# Patient Record
Sex: FEMALE | Race: WHITE
[De-identification: names, ages, dates, MRNs, and addresses within clinical notes are randomized per-mention and may not be internally consistent; named-entity substitution may affect disease eponyms.]

---

## 2018-07-17 ENCOUNTER — HOSPITAL ENCOUNTER (OUTPATIENT)
Dept: HOSPITAL 74 - JASU-ENDO | Age: 73
Discharge: HOME | End: 2018-07-17
Attending: INTERNAL MEDICINE
Payer: COMMERCIAL

## 2018-07-17 VITALS — DIASTOLIC BLOOD PRESSURE: 64 MMHG | SYSTOLIC BLOOD PRESSURE: 119 MMHG

## 2018-07-17 VITALS — HEART RATE: 63 BPM

## 2018-07-17 VITALS — BODY MASS INDEX: 25.7 KG/M2

## 2018-07-17 VITALS — TEMPERATURE: 98 F

## 2018-07-17 DIAGNOSIS — K64.8: ICD-10-CM

## 2018-07-17 DIAGNOSIS — K57.30: ICD-10-CM

## 2018-07-17 DIAGNOSIS — Z86.010: ICD-10-CM

## 2018-07-17 DIAGNOSIS — K63.5: ICD-10-CM

## 2018-07-17 DIAGNOSIS — Z51.11: Primary | ICD-10-CM

## 2018-07-17 PROCEDURE — 0DBG8ZX EXCISION OF LEFT LARGE INTESTINE, VIA NATURAL OR ARTIFICIAL OPENING ENDOSCOPIC, DIAGNOSTIC: ICD-10-PCS | Performed by: INTERNAL MEDICINE

## 2018-07-17 PROCEDURE — 0DBH8ZX EXCISION OF CECUM, VIA NATURAL OR ARTIFICIAL OPENING ENDOSCOPIC, DIAGNOSTIC: ICD-10-PCS | Performed by: INTERNAL MEDICINE

## 2018-07-24 ENCOUNTER — HOSPITAL ENCOUNTER (OUTPATIENT)
Dept: HOSPITAL 74 - JASU-ENDO | Age: 73
Discharge: HOME | End: 2018-07-24
Attending: INTERNAL MEDICINE
Payer: COMMERCIAL

## 2018-07-24 VITALS — HEART RATE: 66 BPM | DIASTOLIC BLOOD PRESSURE: 73 MMHG | SYSTOLIC BLOOD PRESSURE: 128 MMHG

## 2018-07-24 VITALS — TEMPERATURE: 97.9 F

## 2018-07-24 VITALS — BODY MASS INDEX: 26.5 KG/M2

## 2018-07-24 DIAGNOSIS — D21.4: ICD-10-CM

## 2018-07-24 DIAGNOSIS — K44.9: ICD-10-CM

## 2018-07-24 DIAGNOSIS — K29.50: Primary | ICD-10-CM

## 2018-07-24 PROCEDURE — 0DB68ZX EXCISION OF STOMACH, VIA NATURAL OR ARTIFICIAL OPENING ENDOSCOPIC, DIAGNOSTIC: ICD-10-PCS | Performed by: INTERNAL MEDICINE

## 2018-07-24 NOTE — PATH
Surgical Pathology Report



Patient Name:  ALEJANDRO NEWELL

Accession #:  [id number]

[facility]. Rec. #:  [id number]                                                        

   /Age/Gender:  1945 (Age: 73) / F

Account:  [id number]                                                          

             Location: [facility]-ENDOSCOPY

Taken:  2018

Received:  2018

Reported:  2018

Physicians:  Luciano Hernandez D.O.

  



Specimen(s) Received

A: CECAL POLYP 

B: DESCENDING COLON POLYP 





Clinical History

History of colon polyp

Postoperative diagnosis: Diverticulosis, polyp







Final Diagnosis

A. CECUM, POLYP, POLYPECTOMY:

POLYPOID COLONIC MUCOSA WITH MARKED CAUTERY ARTIFACT.



B. DESCENDING COLON, POLYP, POLYPECTOMY:

COLONIC MUCOSA WITH SMALL LYMPHOID AGGREGATE AND SUPERFICIAL HYPERPLASTIC

FEATURES.







***Electronically Signed***

Briseida Lazo M.D.





Gross Description

A.  Received in formalin, labeled "cecal polyp" are 2 tan, irregular portions of

soft tissue measuring 0.1 and 0.3 cm. in greatest dimension. The specimens are

submitted in toto in one cassette.



B.  Received in formalin, labeled "descending colon polyp" is a tan, irregular

portion of soft tissue measuring 0.4 cm. in greatest dimension. The specimen is

submitted in toto in one cassette.

## 2018-07-26 NOTE — PATH
Surgical Pathology Report



Patient Name:  ALEJANDRO NEWELL

Accession #:  [id number]

[facility]. Rec. #:  [id number]                                                        

   /Age/Gender:  1945 (Age: 73) / F

Account:  [id number]                                                          

             Location: [facility]-ENDOSCOPY

Taken:  2018

Received:  2018

Reported:  2018

Physicians:  Lucinao Hernandez D.O.

  



Specimen(s) Received

A: BX ANTRUM NODULE 

B: BX SUBMUCOSAL CARDIA NODULE 





Clinical History

Unspecified chronic gastritis without bleeding

Postoperative diagnosis: Gastritis, submucosal nodule







Final Diagnosis

A. STOMACH, ANTRUM, NODULE, BIOPSY:

POLYPOID GASTRIC ANTRAL MUCOSA WITH MILD CHRONIC GASTRITIS AND FOCAL INTESTINAL

METAPLASIA. IMMUNOHISTOCHEMICAL STAIN FOR H. PYLORI IS NEGATIVE.



B. STOMACH, CARDIA, NODULE, BIOPSY:

LEIOMYOMA.

GASTRIC CARDIAC TYPE MUCOSA WITH MILD CHRONIC GASTRITIS. IMMUNOHISTOCHEMICAL

STAIN FOR H. PYLORI IS NEGATIVE.



Comment: Part B., A separate fragment of nodular spindle proliferation without

significant cytologic atypia is present. Immunohistochemical stains performed at

[place], [place] ([id number]) and interpreted at [facility] show this lesion is positive for SMA and desmin, while

negative for S100, , and DOG-1, consistent with a leiomyoma.





***Electronically Signed***

Briseida Lazo M.D.





Gross Description

A.  Received in formalin, labeled "biopsy antrum nodule" are 3 tan, irregular

portions of soft tissue ranging from 0.2-0.5 cm. in greatest dimension. The

specimens are submitted in toto in one cassette.



B. Received in formalin labeled "biopsy nodule in cardia," is a 0.8 x 0.6 x 0.5

cm tan, polypoid portion of soft tissue. Also received within the same container

are 4 tan soft tissue fragments ranging from 0.3-0.4 cm in greatest dimension.

The specimen is entirely submitted in 2 cassettes as follows: 1-bisected nodule;

2-separately received soft tissue fragments.